# Patient Record
Sex: MALE | Race: WHITE | NOT HISPANIC OR LATINO | Employment: OTHER | URBAN - METROPOLITAN AREA
[De-identification: names, ages, dates, MRNs, and addresses within clinical notes are randomized per-mention and may not be internally consistent; named-entity substitution may affect disease eponyms.]

---

## 2023-09-06 ENCOUNTER — TRANSITIONAL CARE UNIT VISIT (OUTPATIENT)
Dept: GERIATRICS | Facility: CLINIC | Age: 83
End: 2023-09-06
Payer: COMMERCIAL

## 2023-09-06 VITALS
WEIGHT: 220.1 LBS | TEMPERATURE: 97.3 F | RESPIRATION RATE: 19 BRPM | DIASTOLIC BLOOD PRESSURE: 69 MMHG | OXYGEN SATURATION: 95 % | SYSTOLIC BLOOD PRESSURE: 161 MMHG | HEART RATE: 78 BPM

## 2023-09-06 DIAGNOSIS — R26.81 GAIT INSTABILITY: ICD-10-CM

## 2023-09-06 DIAGNOSIS — K51.00 ULCERATIVE PANCOLITIS WITHOUT COMPLICATION (H): ICD-10-CM

## 2023-09-06 DIAGNOSIS — I10 HYPERTENSION, UNSPECIFIED TYPE: ICD-10-CM

## 2023-09-06 DIAGNOSIS — S22.41XD CLOSED FRACTURE OF MULTIPLE RIBS OF RIGHT SIDE WITH ROUTINE HEALING, SUBSEQUENT ENCOUNTER: Primary | ICD-10-CM

## 2023-09-06 PROBLEM — E66.812 CLASS 2 OBESITY DUE TO EXCESS CALORIES WITHOUT SERIOUS COMORBIDITY IN ADULT: Status: ACTIVE | Noted: 2019-10-30

## 2023-09-06 PROBLEM — I48.91 ATRIAL FIBRILLATION (H): Status: ACTIVE | Noted: 2022-09-27

## 2023-09-06 PROBLEM — R06.09 DOE (DYSPNEA ON EXERTION): Status: ACTIVE | Noted: 2023-03-06

## 2023-09-06 PROBLEM — R79.89 SERUM CREATININE RAISED: Status: ACTIVE | Noted: 2023-09-06

## 2023-09-06 PROBLEM — E66.01 MORBID (SEVERE) OBESITY DUE TO EXCESS CALORIES (H): Status: ACTIVE | Noted: 2023-02-14

## 2023-09-06 PROBLEM — G25.0 FAMILIAL TREMOR: Status: ACTIVE | Noted: 2021-05-10

## 2023-09-06 PROBLEM — H35.30 MACULAR DEGENERATION: Status: ACTIVE | Noted: 2023-09-06

## 2023-09-06 PROBLEM — K21.9 GERD (GASTROESOPHAGEAL REFLUX DISEASE): Status: ACTIVE | Noted: 2023-09-06
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
## 2023-09-06 PROBLEM — R41.0 DELIRIUM: Status: ACTIVE | Noted: 2023-09-06

## 2023-09-06 PROBLEM — N52.9 ERECTILE DYSFUNCTION: Status: ACTIVE | Noted: 2023-09-06

## 2023-09-06 PROBLEM — S42.401D CLOSED FRACTURE OF DISTAL END OF RIGHT HUMERUS WITH ROUTINE HEALING: Status: ACTIVE | Noted: 2022-09-27

## 2023-09-06 PROBLEM — E66.09 CLASS 2 OBESITY DUE TO EXCESS CALORIES WITHOUT SERIOUS COMORBIDITY IN ADULT: Status: ACTIVE | Noted: 2019-10-30

## 2023-09-06 PROBLEM — S52.132A CLOSED DISPLACED FRACTURE OF NECK OF LEFT RADIUS: Status: ACTIVE | Noted: 2021-03-12

## 2023-09-06 PROBLEM — F05 DELIRIUM DUE TO ANOTHER MEDICAL CONDITION: Status: ACTIVE | Noted: 2023-09-06

## 2023-09-06 PROBLEM — R79.9 ABNORMAL BLOOD CHEMISTRY LEVEL: Status: ACTIVE | Noted: 2020-09-14

## 2023-09-06 PROBLEM — R25.1 TREMOR: Status: ACTIVE | Noted: 2021-01-27

## 2023-09-06 PROBLEM — S22.49XA CLOSED FRACTURE OF MULTIPLE RIBS: Status: ACTIVE | Noted: 2023-08-24

## 2023-09-06 PROCEDURE — 99310 SBSQ NF CARE HIGH MDM 45: CPT | Performed by: FAMILY MEDICINE

## 2023-09-06 RX ORDER — SERTRALINE HYDROCHLORIDE 100 MG/1
150 TABLET, FILM COATED ORAL DAILY
COMMUNITY

## 2023-09-06 RX ORDER — LANOLIN ALCOHOL/MO/W.PET/CERES
3 CREAM (GRAM) TOPICAL AT BEDTIME
COMMUNITY

## 2023-09-06 RX ORDER — MESALAMINE 0.38 G/1
0.75 CAPSULE, EXTENDED RELEASE ORAL
COMMUNITY

## 2023-09-06 RX ORDER — ATORVASTATIN CALCIUM 80 MG/1
80 TABLET, FILM COATED ORAL DAILY
COMMUNITY

## 2023-09-06 RX ORDER — METOPROLOL SUCCINATE 50 MG/1
50 TABLET, EXTENDED RELEASE ORAL DAILY
COMMUNITY

## 2023-09-06 RX ORDER — CHOLECALCIFEROL (VITAMIN D3) 50 MCG
1 TABLET ORAL DAILY
COMMUNITY

## 2023-09-06 RX ORDER — LIDOCAINE 4 G/G
1 PATCH TOPICAL EVERY 24 HOURS
COMMUNITY

## 2023-09-06 RX ORDER — ACETAMINOPHEN 500 MG
1000 TABLET ORAL 3 TIMES DAILY
COMMUNITY

## 2023-09-06 RX ORDER — AMLODIPINE BESYLATE 5 MG/1
10 TABLET ORAL DAILY
COMMUNITY

## 2023-09-06 RX ORDER — ALBUTEROL SULFATE 90 UG/1
2 AEROSOL, METERED RESPIRATORY (INHALATION) EVERY 6 HOURS PRN
COMMUNITY

## 2023-09-06 RX ORDER — IBUPROFEN 400 MG/1
400 TABLET, FILM COATED ORAL EVERY 6 HOURS PRN
COMMUNITY

## 2023-09-06 RX ORDER — FUROSEMIDE 20 MG
20 TABLET ORAL DAILY
COMMUNITY

## 2023-09-06 RX ORDER — TRAMADOL HYDROCHLORIDE 50 MG/1
25 TABLET ORAL EVERY 6 HOURS PRN
COMMUNITY
End: 2023-09-08

## 2023-09-06 NOTE — LETTER
9/6/2023        RE: Eliezer Stone  [address]  [address]  [facility] [address]        [facility] GERIATRIC SERVICES    Facility:   [facility] ([facility]) [[id number]]   Code Status: DNR/DNI      HPI:   Eliezer is a 83 year old male who was hospitalized August 24, 2023 through August 30, 2023 secondary to a fall.  He was getting out of his car and did not use his walker stepped over his falling forward onto his right side.  His initial injury was on August 18, 2023 and at that time the x-rays did not show any definite fracture and he was given lidocaine patches along with Flexeril and oxycodone.  Went back to the emergency department on August 22 secondary to increased pain and a CT of the chest and abdomen did show multiple minimally displaced fractures involving the lower anterior lateral right rib cage along with mild gallbladder distention and a moderate-sized hiatal hernia as well as moderate to severe coronary artery calcifications.  The x-ray of the lumbar spine did show L5 pars defects with grade 1 anterolisthesis of L5 on S1 and moderate degenerative disc disease but no acute fractures or malalignment.  His blood work on August 28 did show an albumin 3.5, white cell count of 11.2 sodium 142, creatinine 1.07.  He was diagnosed with right-sided multiple rib fractures 7 8 and 9.  Other issues addressed in hospital was his confusion on the 29th no infectious symptoms suspected to be narcotic analgesics.  He also has a history of atrial fibrillation currently on apixaban Toprol-XL.  Other issues addressed was his microscopic hematuria noted on urinalysis he does have a small nonobstructive kidney stone in the upper pole on the left.  Regarding hypertension.  Start amlodipine 5 mg continue with the furosemide and Toprol-XL.  He does have a history of hyperlipidemia currently on atorvastatin also history of depression currently on sertraline also history of GERD currently on pantoprazole.  Her  ulcerative colitis is on mesalamine as well as history of COPD/asthma.  He currently lives in [facility] in [facility].  He grew up in [place].  Has been  60+ years.  His systolic blood pressures ranging 140-170 and he has been afebrile and also on room air with a weight of 220 pounds.  We will now increase the Norvasc 10 mg rather than 5 mg.  He does have tenderness to his right ribs area.  His BIMs 9/15 and PHQ-9 6/27.  His lungs are good appetite improving no issues with ulcerative colitis at home he does use a front wheel walker.  For pain is on scheduled Tylenol 1000 g 3 times daily as well as ibuprofen 400 mg 3 times daily he does have tramadol as needed takes between 1-2 doses per day.    Past Medical History:  No past medical history on file.     Hypertension, GERD, ulcerative colitis, depression, hyperlipidemia, atrial fibrillation, depression, familial tremor, carpal tunnel syndrome, macular degeneration  Surgical History:  No past surgical history on file.  Right distal humeral shaft fracture status post ORIF 2022.  Closed displaced fracture of the neck of the left radius.  Family History:   No family history on file.  Father, heart disease, myocardial infarction, CAD, hyperlipidemia, hypertension  Social History:    Social History     Socioeconomic History     Marital status: Patient Declined        REVIEW OF SYSTEM:  She currently denies any new symptoms of fever cough or cold sore throat postnasal drip shortness of breath dyspnea wheezing chest pain dizziness vertigo nausea vomiting diarrhea dysuria frequency urgency headaches or unusual myalgias or arthralgias.    PHYSICAL EXAM:   Pleasant gentleman in no acute distress.  Head is normocephalic.  Conjunctiva is pink and sclera is clear.  Neck is supple without adenopathy.  Lung sounds are clear throughout.  Cardiovascular does have an irregularity and no lower extremity edema.  Gastrointestinal soft and nontender with positive bowel  sounds.  Musculoskeletal tender to the right rib cage area otherwise at home does use a walker.  Psychiatric: Pleasant affect    Vitals:    09/06/23 0104   BP: (!) 161/69   Pulse: 78   Resp: 19   Temp: 97.3  F (36.3  C)   SpO2: 95%   Weight: 99.8 kg (220 lb 1.6 oz)         Medication List:  Current Outpatient Medications   Medication Sig     acetaminophen (TYLENOL) 500 MG tablet Take 1,000 mg by mouth 3 times daily     albuterol (PROAIR HFA/PROVENTIL HFA/VENTOLIN HFA) 108 (90 Base) MCG/ACT inhaler Inhale 2 puffs into the lungs every 6 hours as needed for shortness of breath, wheezing or cough     amLODIPine (NORVASC) 5 MG tablet Take 10 mg by mouth daily     apixaban ANTICOAGULANT (ELIQUIS) 5 MG tablet Take 5 mg by mouth 2 times daily     atorvastatin (LIPITOR) 80 MG tablet Take 80 mg by mouth daily     Fluticasone-Umeclidin-Vilanterol (TRELEGY ELLIPTA) 200-62.5-25 MCG/ACT oral inhaler Inhale 1 puff into the lungs daily     furosemide (LASIX) 20 MG tablet Take 20 mg by mouth daily     ibuprofen (ADVIL/MOTRIN) 400 MG tablet Take 400 mg by mouth every 6 hours as needed for moderate pain     Lidocaine (LIDOCARE) 4 % Patch Place 1 patch onto the skin every 24 hours To prevent lidocaine toxicity, patient should be patch free for 12 hrs daily.     melatonin 3 MG tablet Take 3 mg by mouth At Bedtime     mesalamine (APRISO ER) 0.375 g 24 hr capsule Take 0.75 g by mouth     metoprolol succinate ER (TOPROL XL) 50 MG 24 hr tablet Take 50 mg by mouth daily     Multiple Vitamins-Minerals (PRESERVISION AREDS PO) Take 1 tablet by mouth 2 times daily     omeprazole (PRILOSEC) 20 MG DR capsule Take 20 mg by mouth daily     sertraline (ZOLOFT) 100 MG tablet Take 150 mg by mouth daily     traMADol (ULTRAM) 50 MG tablet Take 25 mg by mouth every 6 hours as needed for severe pain     vitamin D3 (CHOLECALCIFEROL) 50 mcg (2000 units) tablet Take 1 tablet by mouth daily     No current facility-administered medications for this visit.         MED REC REQUIRED  Post Medication Reconciliation Status: discharge medications reconciled and changed, per note/orders       Labs:  Last Comprehensive Metabolic Panel:  No results found for: NA, POTASSIUM, CHLORIDE, CO2, ANIONGAP, GLC, BUN, CR, GFRESTIMATED, MELODY    CBC RESULTS: No results for input(s): WBC, RBC, HGB, HCT, MCV, MCH, MCHC, RDW, PLT in the last 60518 hours.  August 28 sodium 142, creatinine 1.07, white cell count 11.2, albumin 3.5    Assessment:   (S22.41XD) Closed fracture of multiple ribs of right side with routine healing, subsequent encounter  (primary encounter diagnosis)  Comment: Doing well.  The pain is slowly decreasing  Plan: We will be working with therapy    (I10) Hypertension, unspecified type  Comment: Stable  Plan: Increasing Norvasc 10 mg rather than 5 mg daily    (K51.00) Ulcerative pancolitis without complication (H)  Comment: No current issues  Plan: Continue monitor    (R26.81) Gait instability  Comment: At home uses a walker  Plan: We will work with therapy      PLAN:    Had a wonderful conversation today to talk about not only his chronic medical conditions but also his history based fractures.  Also encourage coughing and deep breathing with incentive spirometry.  Increasing atorvastatin milligram rather than 5 mg daily.  Pain seems to be pretty well managed.  He agrees with the current plan of care.    Documentation purposes total visit 45 minutes to which over 50% was spent with patient going over his chronic medical conditions, gait instability, recent hospitalization, pain management, nutrition and therapy process.      Electronically signed by: Eliezer Gates NP      Sincerely,        Eliezer Gates NP

## 2023-09-06 NOTE — PROGRESS NOTES
[facility] GERIATRIC SERVICES    Facility:   [facility] ([facility]) [[id number]]   Code Status: DNR/DNI      HPI:   Eliezer is a 83 year old male who was hospitalized August 24, 2023 through August 30, 2023 secondary to a fall.  He was getting out of his car and did not use his walker stepped over his falling forward onto his right side.  His initial injury was on August 18, 2023 and at that time the x-rays did not show any definite fracture and he was given lidocaine patches along with Flexeril and oxycodone.  Went back to the emergency department on August 22 secondary to increased pain and a CT of the chest and abdomen did show multiple minimally displaced fractures involving the lower anterior lateral right rib cage along with mild gallbladder distention and a moderate-sized hiatal hernia as well as moderate to severe coronary artery calcifications.  The x-ray of the lumbar spine did show L5 pars defects with grade 1 anterolisthesis of L5 on S1 and moderate degenerative disc disease but no acute fractures or malalignment.  His blood work on August 28 did show an albumin 3.5, white cell count of 11.2 sodium 142, creatinine 1.07.  He was diagnosed with right-sided multiple rib fractures 7 8 and 9.  Other issues addressed in hospital was his confusion on the 29th no infectious symptoms suspected to be narcotic analgesics.  He also has a history of atrial fibrillation currently on apixaban Toprol-XL.  Other issues addressed was his microscopic hematuria noted on urinalysis he does have a small nonobstructive kidney stone in the upper pole on the left.  Regarding hypertension.  Start amlodipine 5 mg continue with the furosemide and Toprol-XL.  He does have a history of hyperlipidemia currently on atorvastatin also history of depression currently on sertraline also history of GERD currently on pantoprazole.  Her ulcerative colitis is on mesalamine as well as history of COPD/asthma.  [place] [facility].  He grew up in [place].  Has been  60+ years.  His systolic blood pressures ranging 140-170 and he has been afebrile and also on room air with a weight of 220 pounds.  We will now increase the Norvasc 10 mg rather than 5 mg.  He does have tenderness to his right ribs area.  His BIMs 9/15 and PHQ-9 6/27.  His lungs are good appetite improving no issues with ulcerative colitis at home he does use a front wheel walker.  For pain is on scheduled Tylenol 1000 g 3 times daily as well as ibuprofen 400 mg 3 times daily he does have tramadol as needed takes between 1-2 doses per day.    Past Medical History:  No past medical history on file.     Hypertension, GERD, ulcerative colitis, depression, hyperlipidemia, atrial fibrillation, depression, familial tremor, carpal tunnel syndrome, macular degeneration  Surgical History:  No past surgical history on file.  Right distal humeral shaft fracture status post ORIF 2022.  Closed displaced fracture of the neck of the left radius.  Family History:   No family history on file.  Father, heart disease, myocardial infarction, CAD, hyperlipidemia, hypertension  Social History:    Social History     Socioeconomic History    Marital status: Patient Declined        REVIEW OF SYSTEM:  She currently denies any new symptoms of fever cough or cold sore throat postnasal drip shortness of breath dyspnea wheezing chest pain dizziness vertigo nausea vomiting diarrhea dysuria frequency urgency headaches or unusual myalgias or arthralgias.    PHYSICAL EXAM:   Pleasant gentleman in no acute distress.  Head is normocephalic.  Conjunctiva is pink and sclera is clear.  Neck is supple without adenopathy.  Lung sounds are clear throughout.  Cardiovascular does have an irregularity and no lower extremity edema.  Gastrointestinal soft and nontender with positive bowel sounds.  Musculoskeletal tender to the right rib cage area otherwise at home does use a walker.  Psychiatric:  Pleasant affect    Vitals:    09/06/23 0104   BP: (!) 161/69   Pulse: 78   Resp: 19   Temp: 97.3  F (36.3  C)   SpO2: 95%   Weight: 99.8 kg (220 lb 1.6 oz)         Medication List:  Current Outpatient Medications   Medication Sig    acetaminophen (TYLENOL) 500 MG tablet Take 1,000 mg by mouth 3 times daily    albuterol (PROAIR HFA/PROVENTIL HFA/VENTOLIN HFA) 108 (90 Base) MCG/ACT inhaler Inhale 2 puffs into the lungs every 6 hours as needed for shortness of breath, wheezing or cough    amLODIPine (NORVASC) 5 MG tablet Take 10 mg by mouth daily    apixaban ANTICOAGULANT (ELIQUIS) 5 MG tablet Take 5 mg by mouth 2 times daily    atorvastatin (LIPITOR) 80 MG tablet Take 80 mg by mouth daily    Fluticasone-Umeclidin-Vilanterol (TRELEGY ELLIPTA) 200-62.5-25 MCG/ACT oral inhaler Inhale 1 puff into the lungs daily    furosemide (LASIX) 20 MG tablet Take 20 mg by mouth daily    ibuprofen (ADVIL/MOTRIN) 400 MG tablet Take 400 mg by mouth every 6 hours as needed for moderate pain    Lidocaine (LIDOCARE) 4 % Patch Place 1 patch onto the skin every 24 hours To prevent lidocaine toxicity, patient should be patch free for 12 hrs daily.    melatonin 3 MG tablet Take 3 mg by mouth At Bedtime    mesalamine (APRISO ER) 0.375 g 24 hr capsule Take 0.75 g by mouth    metoprolol succinate ER (TOPROL XL) 50 MG 24 hr tablet Take 50 mg by mouth daily    Multiple Vitamins-Minerals (PRESERVISION AREDS PO) Take 1 tablet by mouth 2 times daily    omeprazole (PRILOSEC) 20 MG DR capsule Take 20 mg by mouth daily    sertraline (ZOLOFT) 100 MG tablet Take 150 mg by mouth daily    traMADol (ULTRAM) 50 MG tablet Take 25 mg by mouth every 6 hours as needed for severe pain    vitamin D3 (CHOLECALCIFEROL) 50 mcg (2000 units) tablet Take 1 tablet by mouth daily     No current facility-administered medications for this visit.        MED REC REQUIRED  Post Medication Reconciliation Status: discharge medications reconciled and changed, per note/orders        Labs:  Last Comprehensive Metabolic Panel:  No results found for: NA, POTASSIUM, CHLORIDE, CO2, ANIONGAP, GLC, BUN, CR, GFRESTIMATED, MELODY    CBC RESULTS: No results for input(s): WBC, RBC, HGB, HCT, MCV, MCH, MCHC, RDW, PLT in the last 44161 hours.  August 28 sodium 142, creatinine 1.07, white cell count 11.2, albumin 3.5    Assessment:   (S22.41XD) Closed fracture of multiple ribs of right side with routine healing, subsequent encounter  (primary encounter diagnosis)  Comment: Doing well.  The pain is slowly decreasing  Plan: We will be working with therapy    (I10) Hypertension, unspecified type  Comment: Stable  Plan: Increasing Norvasc 10 mg rather than 5 mg daily    (K51.00) Ulcerative pancolitis without complication (H)  Comment: No current issues  Plan: Continue monitor    (R26.81) Gait instability  Comment: At home uses a walker  Plan: We will work with therapy      PLAN:    Had a wonderful conversation today to talk about not only his chronic medical conditions but also his history based fractures.  Also encourage coughing and deep breathing with incentive spirometry.  Increasing atorvastatin milligram rather than 5 mg daily.  Pain seems to be pretty well managed.  He agrees with the current plan of care.    Documentation purposes total visit 45 minutes to which over 50% was spent with patient going over his chronic medical conditions, gait instability, recent hospitalization, pain management, nutrition and therapy process.      Electronically signed by: Eliezer Gates NP

## 2023-09-07 VITALS
SYSTOLIC BLOOD PRESSURE: 136 MMHG | HEART RATE: 65 BPM | BODY MASS INDEX: 34.55 KG/M2 | WEIGHT: 220.1 LBS | HEIGHT: 67 IN | TEMPERATURE: 97.1 F | OXYGEN SATURATION: 93 % | RESPIRATION RATE: 18 BRPM | DIASTOLIC BLOOD PRESSURE: 77 MMHG

## 2023-09-08 ENCOUNTER — TRANSITIONAL CARE UNIT VISIT (OUTPATIENT)
Dept: GERIATRICS | Facility: CLINIC | Age: 83
End: 2023-09-08
Payer: COMMERCIAL

## 2023-09-08 DIAGNOSIS — K21.9 GASTROESOPHAGEAL REFLUX DISEASE WITHOUT ESOPHAGITIS: ICD-10-CM

## 2023-09-08 DIAGNOSIS — R26.81 GAIT INSTABILITY: ICD-10-CM

## 2023-09-08 DIAGNOSIS — S22.41XD CLOSED FRACTURE OF MULTIPLE RIBS OF RIGHT SIDE WITH ROUTINE HEALING, SUBSEQUENT ENCOUNTER: ICD-10-CM

## 2023-09-08 DIAGNOSIS — I10 HYPERTENSION, UNSPECIFIED TYPE: Primary | ICD-10-CM

## 2023-09-08 PROCEDURE — 99316 NF DSCHRG MGMT 30 MIN+: CPT | Performed by: FAMILY MEDICINE

## 2023-09-08 NOTE — PROGRESS NOTES
[facility] GERIATRIC SERVICES    Facility:   [facility] ([facility]) [[id number]]   Code Status: DNR/DNI      CHIEF COMPLAINT/REASON FOR VISIT:  Chief Complaint   Patient presents with    Discharge Summary Nursing Home       HISTORY:      HPI: Eliezer is a 83 year old male who was hospitalized August 24, 2023 through August 30, 2023 secondary to a fall.  He was getting out of his car and did not use his walker stepped over his falling forward onto his right side.  His initial injury was on August 18, 2023 and at that time the x-rays did not show any definite fracture and he was given lidocaine patches along with Flexeril and oxycodone.  Went back to the emergency department on August 22 secondary to increased pain and a CT of the chest and abdomen did show multiple minimally displaced fractures involving the lower anterior lateral right rib cage along with mild gallbladder distention and a moderate-sized hiatal hernia as well as moderate to severe coronary artery calcifications.  The x-ray of the lumbar spine did show L5 pars defects with grade 1 anterolisthesis of L5 on S1 and moderate degenerative disc disease but no acute fractures or malalignment.  His blood work on August 28 did show an albumin 3.5, white cell count of 11.2 sodium 142, creatinine 1.07.  He was diagnosed with right-sided multiple rib fractures 7 8 and 9.  Other issues addressed in hospital was his confusion on the 29th no infectious symptoms suspected to be narcotic analgesics.  He also has a history of atrial fibrillation currently on apixaban Toprol-XL.  Other issues addressed was his microscopic hematuria noted on urinalysis he does have a small nonobstructive kidney stone in the upper pole on the left.  Regarding hypertension.  Start amlodipine 5 mg continue with the furosemide and Toprol-XL.  He does have a history of hyperlipidemia currently on atorvastatin also history of depression currently on sertraline also history of GERD currently  on pantoprazole.  His ulcerative colitis is on mesalamine as well as history of COPD/asthma.  He has been on the transitional care unit and was recently admitted to the transitional care unit and now per the  he will now have a discharge to home by the weekend but apparently he is appealing the discharge.  He states that he is not sure if he is ready yet to be able to go home even despite having various services.  So at this point there is a potential for discharge by September 11 so they wanted me to do the discharge summary today.  The meantime he has eating well.  Blood pressure slightly elevated with a recent increase of Norvasc 10 mg rather than 5 mg.  He does complain of the rib pain.  His BIMS 9/15 and PHQ-9 6/27.  He is on Tylenol scheduled as well as ibuprofen scheduled and he would like to keep it that way and he has not required any tramadol now for the last couple of days as needed.  Denies any respiratory issues and also denies any ulcerative colitis problems.  He otherwise is in pretty good spirits overall.  His rib area does remain sore.  No past medical history on file.         No family history on file.   Social History     Socioeconomic History    Marital status: Patient Declined        REVIEW OF SYSTEM:  he currently denies any new symptoms of fever cough or cold sore throat postnasal drip shortness of breath dyspnea wheezing chest pain dizziness vertigo nausea vomiting diarrhea dysuria frequency urgency headaches or unusual myalgias or arthralgias.     PHYSICAL EXAM:Pleasant gentleman in no acute distress. Head is normocephalic.Neck is supple without adenopathy. Lung sounds are clear throughout. Cardiovascular does have an irregularity and no lower extremity edema. Gastrointestinal soft and nontender with positive bowel sounds. Musculoskeletal tender to the right rib cage area otherwise at home does use a walker. Psychiatric: Pleasant affect        Current Outpatient Medications:      "acetaminophen (TYLENOL) 500 MG tablet, Take 1,000 mg by mouth 3 times daily, Disp: , Rfl:     albuterol (PROAIR HFA/PROVENTIL HFA/VENTOLIN HFA) 108 (90 Base) MCG/ACT inhaler, Inhale 2 puffs into the lungs every 6 hours as needed for shortness of breath, wheezing or cough, Disp: , Rfl:     amLODIPine (NORVASC) 5 MG tablet, Take 10 mg by mouth daily, Disp: , Rfl:     apixaban ANTICOAGULANT (ELIQUIS) 5 MG tablet, Take 5 mg by mouth 2 times daily, Disp: , Rfl:     atorvastatin (LIPITOR) 80 MG tablet, Take 80 mg by mouth daily, Disp: , Rfl:     Fluticasone-Umeclidin-Vilanterol (TRELEGY ELLIPTA) 200-62.5-25 MCG/ACT oral inhaler, Inhale 1 puff into the lungs daily, Disp: , Rfl:     furosemide (LASIX) 20 MG tablet, Take 20 mg by mouth daily, Disp: , Rfl:     ibuprofen (ADVIL/MOTRIN) 400 MG tablet, Take 400 mg by mouth every 6 hours as needed for moderate pain, Disp: , Rfl:     Lidocaine (LIDOCARE) 4 % Patch, Place 1 patch onto the skin every 24 hours To prevent lidocaine toxicity, patient should be patch free for 12 hrs daily., Disp: , Rfl:     melatonin 3 MG tablet, Take 3 mg by mouth At Bedtime, Disp: , Rfl:     mesalamine (APRISO ER) 0.375 g 24 hr capsule, Take 0.75 g by mouth, Disp: , Rfl:     metoprolol succinate ER (TOPROL XL) 50 MG 24 hr tablet, Take 50 mg by mouth daily, Disp: , Rfl:     Multiple Vitamins-Minerals (PRESERVISION AREDS PO), Take 1 tablet by mouth 2 times daily, Disp: , Rfl:     omeprazole (PRILOSEC) 20 MG DR capsule, Take 20 mg by mouth daily, Disp: , Rfl:     sertraline (ZOLOFT) 100 MG tablet, Take 150 mg by mouth daily, Disp: , Rfl:     vitamin D3 (CHOLECALCIFEROL) 50 mcg (2000 units) tablet, Take 1 tablet by mouth daily, Disp: , Rfl:     /77   Pulse 65   Temp 97.1  F (36.2  C)   Resp 18   Ht 1.702 m (5' 7\")   Wt 99.8 kg (220 lb 1.6 oz)   SpO2 93%   BMI 34.47 kg/m        LABS:   Last Comprehensive Metabolic Panel:  No results found for: NA, POTASSIUM, CHLORIDE, CO2, ANIONGAP, GLC, BUN, " CR, GFRESTIMATED, MELODY    CBC RESULTS: No results for input(s): WBC, RBC, HGB, HCT, MCV, MCH, MCHC, RDW, PLT in the last 89736 hours.      ASSESSMENT:    Encounter Diagnoses   Name Primary?    Hypertension, unspecified type Yes    Closed fracture of multiple ribs of right side with routine healing, subsequent encounter     Gait instability     Gastroesophageal reflux disease without esophagitis        MEDICAL EQUIPMENT NEEDS:  None    DISCHARGE PLAN/FACE TO FACE:  I certify that services are/were furnished while this patient was under the care of a physician and that a physician or an allowed non-physician practitioner (NPP), had a face-to-face encounter that meets the physician face-to-face encounter requirements. The encounter was in whole, or in part, related to the primary reason for home health. The patient is confined to his/her home and needs intermittent skilled nursing, physical therapy, speech-language pathology, or the continued need for occupational therapy. A plan of care has been established by a physician and is periodically reviewed by a physician.  Date of Face-to-Face Encounter: September 8, 2023    I certify that, based on my findings, the following services are medically necessary home health services: He will now be discharging to home with an approximated discharge date of September 11, 2023 with current medications as well as receive physical and occupational therapy home health aide nursing and     My clinical findings support the need for the above skilled services because: (Please write a brief narrative summary that describes what the RN, PT, SLP, or other services will be doing in the home. A list of diagnoses in this section does not meet the CMS requirements.)  He will require the skilled services secondary to his chronic medical conditions including his recent hospitalization and his history of fall as well as attending to his home for safety as well as continued exercise  and nursing for medication management and case management to support    This patient is homebound because: (Please write a brief narrative summary describing the functional limitations as to why this patient is homebound and specifically what makes this patient homebound.)  Secondary to chronic medical conditions including assistance with ADLs as well as continuation of home safety and transfers along with various continued exercises and for medication management and  for community support    The patient is, or has been, under my care and I have initiated the establishment of the plan of care. This patient will be followed by a physician who will periodically review the plan of care.    Schedule follow up visit with primary care provider within 7 days to reestablish care.  He will follow-up with his primary care doctor regarding medication management and any future laboratory studies.  He has been a delight to get to know.  Had a very short stay on the transitional care unit.  He did not have any other questions.    Discharge coordination care greater than 30 minutes    Electronically signed by: Eliezer Gates NP

## 2023-09-08 NOTE — LETTER
9/8/2023        RE: Eliezer Stone  [address]  [address]  [place] [address]        [facility]ATR[facility]    Facility:   [facility] ([facility]) [[id number]]   Code Status: DNR/DNI      CHIEF COMPLAINT/REASON FOR VISIT:  Chief Complaint   Patient presents with     Discharge Summary Nursing Home       HISTORY:      HPI: Eliezer is a 83 year old male who was hospitalized August 24, 2023 through August 30, 2023 secondary to a fall.  He was getting out of his car and did not use his walker stepped over his falling forward onto his right side.  His initial injury was on August 18, 2023 and at that time the x-rays did not show any definite fracture and he was given lidocaine patches along with Flexeril and oxycodone.  Went back to the emergency department on August 22 secondary to increased pain and a CT of the chest and abdomen did show multiple minimally displaced fractures involving the lower anterior lateral right rib cage along with mild gallbladder distention and a moderate-sized hiatal hernia as well as moderate to severe coronary artery calcifications.  The x-ray of the lumbar spine did show L5 pars defects with grade 1 anterolisthesis of L5 on S1 and moderate degenerative disc disease but no acute fractures or malalignment.  His blood work on August 28 did show an albumin 3.5, white cell count of 11.2 sodium 142, creatinine 1.07.  He was diagnosed with right-sided multiple rib fractures 7 8 and 9.  Other issues addressed in hospital was his confusion on the 29th no infectious symptoms suspected to be narcotic analgesics.  He also has a history of atrial fibrillation currently on apixaban Toprol-XL.  Other issues addressed was his microscopic hematuria noted on urinalysis he does have a small nonobstructive kidney stone in the upper pole on the left.  Regarding hypertension.  Start amlodipine 5 mg continue with the furosemide and Toprol-XL.  He does have a history of hyperlipidemia  currently on atorvastatin also history of depression currently on sertraline also history of GERD currently on pantoprazole.  His ulcerative colitis is on mesalamine as well as history of COPD/asthma.  He has been on the transitional care unit and was recently admitted to the transitional care unit and now per the  he will now have a discharge to home by the weekend but apparently he is appealing the discharge.  He states that he is not sure if he is ready yet to be able to go home even despite having various services.  So at this point there is a potential for discharge by September 11 so they wanted me to do the discharge summary today.  The meantime he has eating well.  Blood pressure slightly elevated with a recent increase of Norvasc 10 mg rather than 5 mg.  He does complain of the rib pain.  His BIMS 9/15 and PHQ-9 6/27.  He is on Tylenol scheduled as well as ibuprofen scheduled and he would like to keep it that way and he has not required any tramadol now for the last couple of days as needed.  Denies any respiratory issues and also denies any ulcerative colitis problems.  He otherwise is in pretty good spirits overall.  His rib area does remain sore.  No past medical history on file.         No family history on file.   Social History     Socioeconomic History     Marital status: Patient Declined        REVIEW OF SYSTEM:  he currently denies any new symptoms of fever cough or cold sore throat postnasal drip shortness of breath dyspnea wheezing chest pain dizziness vertigo nausea vomiting diarrhea dysuria frequency urgency headaches or unusual myalgias or arthralgias.     PHYSICAL EXAM:Pleasant gentleman in no acute distress. Head is normocephalic.Neck is supple without adenopathy. Lung sounds are clear throughout. Cardiovascular does have an irregularity and no lower extremity edema. Gastrointestinal soft and nontender with positive bowel sounds. Musculoskeletal tender to the right rib cage area  "otherwise at home does use a walker. Psychiatric: Pleasant affect        Current Outpatient Medications:      acetaminophen (TYLENOL) 500 MG tablet, Take 1,000 mg by mouth 3 times daily, Disp: , Rfl:      albuterol (PROAIR HFA/PROVENTIL HFA/VENTOLIN HFA) 108 (90 Base) MCG/ACT inhaler, Inhale 2 puffs into the lungs every 6 hours as needed for shortness of breath, wheezing or cough, Disp: , Rfl:      amLODIPine (NORVASC) 5 MG tablet, Take 10 mg by mouth daily, Disp: , Rfl:      apixaban ANTICOAGULANT (ELIQUIS) 5 MG tablet, Take 5 mg by mouth 2 times daily, Disp: , Rfl:      atorvastatin (LIPITOR) 80 MG tablet, Take 80 mg by mouth daily, Disp: , Rfl:      Fluticasone-Umeclidin-Vilanterol (TRELEGY ELLIPTA) 200-62.5-25 MCG/ACT oral inhaler, Inhale 1 puff into the lungs daily, Disp: , Rfl:      furosemide (LASIX) 20 MG tablet, Take 20 mg by mouth daily, Disp: , Rfl:      ibuprofen (ADVIL/MOTRIN) 400 MG tablet, Take 400 mg by mouth every 6 hours as needed for moderate pain, Disp: , Rfl:      Lidocaine (LIDOCARE) 4 % Patch, Place 1 patch onto the skin every 24 hours To prevent lidocaine toxicity, patient should be patch free for 12 hrs daily., Disp: , Rfl:      melatonin 3 MG tablet, Take 3 mg by mouth At Bedtime, Disp: , Rfl:      mesalamine (APRISO ER) 0.375 g 24 hr capsule, Take 0.75 g by mouth, Disp: , Rfl:      metoprolol succinate ER (TOPROL XL) 50 MG 24 hr tablet, Take 50 mg by mouth daily, Disp: , Rfl:      Multiple Vitamins-Minerals (PRESERVISION AREDS PO), Take 1 tablet by mouth 2 times daily, Disp: , Rfl:      omeprazole (PRILOSEC) 20 MG DR capsule, Take 20 mg by mouth daily, Disp: , Rfl:      sertraline (ZOLOFT) 100 MG tablet, Take 150 mg by mouth daily, Disp: , Rfl:      vitamin D3 (CHOLECALCIFEROL) 50 mcg (2000 units) tablet, Take 1 tablet by mouth daily, Disp: , Rfl:     /77   Pulse 65   Temp 97.1  F (36.2  C)   Resp 18   Ht 1.702 m (5' 7\")   Wt 99.8 kg (220 lb 1.6 oz)   SpO2 93%   BMI 34.47 " kg/m        LABS:   Last Comprehensive Metabolic Panel:  No results found for: NA, POTASSIUM, CHLORIDE, CO2, ANIONGAP, GLC, BUN, CR, GFRESTIMATED, MELODY    CBC RESULTS: No results for input(s): WBC, RBC, HGB, HCT, MCV, MCH, MCHC, RDW, PLT in the last 31776 hours.      ASSESSMENT:    Encounter Diagnoses   Name Primary?     Hypertension, unspecified type Yes     Closed fracture of multiple ribs of right side with routine healing, subsequent encounter      Gait instability      Gastroesophageal reflux disease without esophagitis        MEDICAL EQUIPMENT NEEDS:  None    DISCHARGE PLAN/FACE TO FACE:  I certify that services are/were furnished while this patient was under the care of a physician and that a physician or an allowed non-physician practitioner (NPP), had a face-to-face encounter that meets the physician face-to-face encounter requirements. The encounter was in whole, or in part, related to the primary reason for home health. The patient is confined to his/her home and needs intermittent skilled nursing, physical therapy, speech-language pathology, or the continued need for occupational therapy. A plan of care has been established by a physician and is periodically reviewed by a physician.  Date of Face-to-Face Encounter: September 8, 2023    I certify that, based on my findings, the following services are medically necessary home health services: He will now be discharging to home with an approximated discharge date of September 11, 2023 with current medications as well as receive physical and occupational therapy home health aide nursing and     My clinical findings support the need for the above skilled services because: (Please write a brief narrative summary that describes what the RN, PT, SLP, or other services will be doing in the home. A list of diagnoses in this section does not meet the CMS requirements.)  He will require the skilled services secondary to his chronic medical conditions  including his recent hospitalization and his history of fall as well as attending to his home for safety as well as continued exercise and nursing for medication management and case management to support    This patient is homebound because: (Please write a brief narrative summary describing the functional limitations as to why this patient is homebound and specifically what makes this patient homebound.)  Secondary to chronic medical conditions including assistance with ADLs as well as continuation of home safety and transfers along with various continued exercises and for medication management and  for community support    The patient is, or has been, under my care and I have initiated the establishment of the plan of care. This patient will be followed by a physician who will periodically review the plan of care.    Schedule follow up visit with primary care provider within 7 days to reestablish care.  He will follow-up with his primary care doctor regarding medication management and any future laboratory studies.  He has been a delight to get to know.  Had a very short stay on the transitional care unit.  He did not have any other questions.    Discharge coordination care greater than 30 minutes    Electronically signed by: Eliezer Gates NP      Sincerely,        Eliezer Gates NP